# Patient Record
Sex: FEMALE | Race: BLACK OR AFRICAN AMERICAN
[De-identification: names, ages, dates, MRNs, and addresses within clinical notes are randomized per-mention and may not be internally consistent; named-entity substitution may affect disease eponyms.]

---

## 2019-10-10 ENCOUNTER — APPOINTMENT (OUTPATIENT)
Dept: OTOLARYNGOLOGY | Facility: CLINIC | Age: 69
End: 2019-10-10

## 2019-10-10 PROBLEM — Z00.00 ENCOUNTER FOR PREVENTIVE HEALTH EXAMINATION: Status: ACTIVE | Noted: 2019-10-10

## 2021-10-06 ENCOUNTER — RESULT REVIEW (OUTPATIENT)
Age: 71
End: 2021-10-06

## 2021-10-25 ENCOUNTER — NON-APPOINTMENT (OUTPATIENT)
Age: 71
End: 2021-10-25

## 2021-10-27 ENCOUNTER — APPOINTMENT (OUTPATIENT)
Dept: BREAST CENTER | Facility: CLINIC | Age: 71
End: 2021-10-27
Payer: MEDICARE

## 2021-10-27 ENCOUNTER — NON-APPOINTMENT (OUTPATIENT)
Age: 71
End: 2021-10-27

## 2021-10-27 VITALS
HEIGHT: 64 IN | WEIGHT: 145 LBS | OXYGEN SATURATION: 99 % | DIASTOLIC BLOOD PRESSURE: 79 MMHG | SYSTOLIC BLOOD PRESSURE: 195 MMHG | HEART RATE: 72 BPM | BODY MASS INDEX: 24.75 KG/M2

## 2021-10-27 DIAGNOSIS — R92.8 OTHER ABNORMAL AND INCONCLUSIVE FINDINGS ON DIAGNOSTIC IMAGING OF BREAST: ICD-10-CM

## 2021-10-27 DIAGNOSIS — Z86.79 PERSONAL HISTORY OF OTHER DISEASES OF THE CIRCULATORY SYSTEM: ICD-10-CM

## 2021-10-27 DIAGNOSIS — Z86.39 PERSONAL HISTORY OF OTHER ENDOCRINE, NUTRITIONAL AND METABOLIC DISEASE: ICD-10-CM

## 2021-10-27 DIAGNOSIS — Z78.9 OTHER SPECIFIED HEALTH STATUS: ICD-10-CM

## 2021-10-27 DIAGNOSIS — T82.867A THROMBOSIS DUE CARDIAC PROSTHETIC DEVICES, IMPLANTS AND GRAFTS, INITIAL ENCOUNTER: ICD-10-CM

## 2021-10-27 PROCEDURE — 99203 OFFICE O/P NEW LOW 30 MIN: CPT

## 2021-10-27 RX ORDER — AMLODIPINE BESYLATE 5 MG/1
TABLET ORAL
Refills: 0 | Status: ACTIVE | COMMUNITY

## 2021-10-27 RX ORDER — INSULIN LISPRO 100 [IU]/ML
INJECTION, SOLUTION INTRAVENOUS; SUBCUTANEOUS
Refills: 0 | Status: ACTIVE | COMMUNITY

## 2021-11-01 NOTE — DATA REVIEWED
[FreeTextEntry1] : 5/21/21 ([facility]): B/L MG- Fibroglandular. B/l benign calcs. L upper and outer nodule w/ macrocalcs, consistent with a calcifying fibroadenoma. New R upper and outer nodular density w/ associated calcs. BIRADS 0.\par \par 8/26/21 ([facility]): Dx R MG & B/l US- Fibroglandular. Persistent R UOQ, 9FN grouped microcalcs. US- R 0.9cm 9:00. 9FN hypoechoic nodule w/ internal calcs. Benign appearing R axillary tail LN. L 1.1cm 2:00, 3FN calcified mass, c/w MG findings. L 0.6cm 6:00, 0FN hypoechoic nodule. Benign appearing L axillary tail LN. BIRADS 4.\par \par 10/6/21: US guided core bx x2: \par SITE 1: R 0.9cm 9:00, 9FN hypoechocic mass- "Ribbon" clip. hyalinized fibroadenoma with calcifications and fibrocystic changes\par SITE 2: L 0.5cm, 6:00, RA complex cyst- "Ribbon" clip. Dilated duct with adjacent fibrosis and fibrocystic change with papillary apocrine metaplasia.\par Benign and concordant. Recommend 6 month follow up bilateral MG & US.

## 2021-11-01 NOTE — REASON FOR VISIT
[Consultation] : a consultation visit [Family Member] : family member [FreeTextEntry1] : right breast fibroadenoma

## 2021-11-01 NOTE — HISTORY OF PRESENT ILLNESS
[FreeTextEntry1] : 71 year postmenopausal female referred by Dr. Erika Llamas, presents for initial evaluation for new right 0.9cm 9:00 hyalinized fibroadenoma and left 0.5cm 6:00 benign breast tissue after abnormal findings on screening imaging. Patient has no breast complaints today. Denies palpable lumps, nipple discharge, nipple/breast skin changes or dimpling. Denies fever and chills. \par \par PAMELLA lifetime risk is 2.4%\par \par Discussed fibroadenomas and the risk of encountering a phyllodes tumor. With regards to size, growth, radiologic characteristics and patient's lifetime breast cancer risk. Patient understood.\par

## 2021-11-01 NOTE — PAST MEDICAL HISTORY
[Menarche Age ____] : age at menarche was [unfilled] [Menopause Age____] : age at menopause was [unfilled] [Total Preg ___] : G[unfilled] [Live Births ___] : P[unfilled]  [Age At Live Birth ___] : Age at live birth: [unfilled] [History of Hormone Replacement Treatment] : has no history of hormone replacement treatment [FreeTextEntry5] : no [FreeTextEntry6] : nono [FreeTextEntry8] : no

## 2022-01-20 ENCOUNTER — NON-APPOINTMENT (OUTPATIENT)
Age: 72
End: 2022-01-20

## 2022-02-01 ENCOUNTER — NON-APPOINTMENT (OUTPATIENT)
Age: 72
End: 2022-02-01

## 2022-02-02 ENCOUNTER — APPOINTMENT (OUTPATIENT)
Dept: BREAST CENTER | Facility: CLINIC | Age: 72
End: 2022-02-02

## 2022-08-10 ENCOUNTER — NON-APPOINTMENT (OUTPATIENT)
Age: 72
End: 2022-08-10